# Patient Record
Sex: MALE | Race: BLACK OR AFRICAN AMERICAN | URBAN - METROPOLITAN AREA
[De-identification: names, ages, dates, MRNs, and addresses within clinical notes are randomized per-mention and may not be internally consistent; named-entity substitution may affect disease eponyms.]

---

## 2020-11-06 ENCOUNTER — OFFICE VISIT (OUTPATIENT)
Dept: URBAN - METROPOLITAN AREA CLINIC 98 | Facility: CLINIC | Age: 50
End: 2020-11-06

## 2021-01-19 ENCOUNTER — OFFICE VISIT (OUTPATIENT)
Dept: URBAN - METROPOLITAN AREA CLINIC 98 | Facility: CLINIC | Age: 51
End: 2021-01-19
Payer: COMMERCIAL

## 2021-01-19 VITALS
TEMPERATURE: 97.1 F | HEIGHT: 68 IN | WEIGHT: 183.8 LBS | SYSTOLIC BLOOD PRESSURE: 162 MMHG | BODY MASS INDEX: 27.86 KG/M2 | HEART RATE: 71 BPM | DIASTOLIC BLOOD PRESSURE: 97 MMHG

## 2021-01-19 DIAGNOSIS — R74.8 ELEVATED LIVER ENZYMES: ICD-10-CM

## 2021-01-19 DIAGNOSIS — K76.0 FATTY LIVER: ICD-10-CM

## 2021-01-19 DIAGNOSIS — Z12.11 COLON CANCER SCREENING: ICD-10-CM

## 2021-01-19 PROCEDURE — G8482 FLU IMMUNIZE ORDER/ADMIN: HCPCS | Performed by: INTERNAL MEDICINE

## 2021-01-19 PROCEDURE — 99244 OFF/OP CNSLTJ NEW/EST MOD 40: CPT | Performed by: INTERNAL MEDICINE

## 2021-01-19 PROCEDURE — 99204 OFFICE O/P NEW MOD 45 MIN: CPT | Performed by: INTERNAL MEDICINE

## 2021-01-19 RX ORDER — SODIUM, POTASSIUM,MAG SULFATES 17.5-3.13G
354 ML SOLUTION, RECONSTITUTED, ORAL ORAL
Qty: 354 ML | Refills: 0 | OUTPATIENT
Start: 2021-01-19 | End: 2021-01-20

## 2021-01-19 NOTE — HPI-TODAY'S VISIT:
HEre on referral from Dr Piper Martinez for elevated liver tests and fatty liver.  A copy of this note and findings will be sent to him as well as ID DR Twin Joshi.  Pt w HIV dx 2001 Dr Joshi - Kaletra + combivir --> Truvada. also other combinations as well now on Biktarvy, started abt 1 yr ago med changes bc pt wanted new meds, no bc he failed / got resistance. . middle of last year- started to see liver test elevated.   also found to have Type 2 DM : middle of last yr started on Metformin  BP also started to rise at the same time. has not had cholesterol med but levels have  been a little high in past. He admits freely: " I don't exercise, I eat out a lot" no tob or alcohol however

## 2021-01-20 ENCOUNTER — TELEPHONE ENCOUNTER (OUTPATIENT)
Dept: URBAN - METROPOLITAN AREA SURGERY CENTER 30 | Facility: SURGERY CENTER | Age: 51
End: 2021-01-20

## 2021-01-21 LAB
A/G RATIO: 1.2
ACTIN (SMOOTH MUSCLE) ANTIBODY: 12
ALBUMIN: 4.2
ALKALINE PHOSPHATASE: 80
ALT (SGPT): 74
ANTI-CENTROMERE B ANTIBODIES: <0.2
ANTI-DNA (DS) AB QN: <1
ANTI-JO-1: <0.2
ANTICHROMATIN ANTIBODIES: <0.2
ANTISCLERODERMA-70 ANTIBODIES: <0.2
AST (SGOT): 52
BASO (ABSOLUTE): 0.1
BASOS: 1
BILIRUBIN, TOTAL: 0.4
BUN/CREATININE RATIO: 12
BUN: 13
CALCIUM: 9.3
CARBON DIOXIDE, TOTAL: 24
CHLORIDE: 104
COMMENT:: (no result)
CREATININE: 1.08
EGFR IF AFRICN AM: 93
EGFR IF NONAFRICN AM: 81
EOS (ABSOLUTE): 0.1
EOS: 1
FERRITIN, SERUM: 557
GGT: 69
GLOBULIN, TOTAL: 3.6
GLUCOSE: 94
HBSAG SCREEN: NEGATIVE
HCV AB: <0.1
HEMATOCRIT: 45.8
HEMATOLOGY COMMENTS:: (no result)
HEMOGLOBIN: 16.2
HEP A AB, TOTAL: NEGATIVE
HEP B CORE AB, TOT: POSITIVE
HEP B SURFACE AB, QUAL: REACTIVE
IMMATURE CELLS: (no result)
IMMATURE GRANS (ABS): 0.1
IMMATURE GRANULOCYTES: 1
IMMUNOGLOBULIN A, QN, SERUM: 562
IMMUNOGLOBULIN G, QN, SERUM: 1617
IMMUNOGLOBULIN M, QN, SERUM: 50
IRON BIND.CAP.(TIBC): 306
IRON SATURATION: 35
IRON: 107
LYMPHS (ABSOLUTE): 5.6
LYMPHS: 51
Lab: (no result)
MCH: 33.6
MCHC: 35.4
MCV: 95
MITOCHONDRIAL (M2) ANTIBODY: 24.8
MONOCYTES(ABSOLUTE): 0.7
MONOCYTES: 6
NEUTROPHILS (ABSOLUTE): 4.4
NEUTROPHILS: 40
NRBC: (no result)
PLATELETS: 142
POTASSIUM: 4.1
PROTEIN, TOTAL: 7.8
RBC: 4.82
RDW: 12.2
RNP ANTIBODIES: 0.2
SJOGREN'S ANTI-SS-A: <0.2
SJOGREN'S ANTI-SS-B: <0.2
SMITH ANTIBODIES: <0.2
SODIUM: 143
UIBC: 199
WBC: 10.9

## 2021-01-26 ENCOUNTER — TELEPHONE ENCOUNTER (OUTPATIENT)
Dept: URBAN - METROPOLITAN AREA CLINIC 92 | Facility: CLINIC | Age: 51
End: 2021-01-26

## 2021-01-27 ENCOUNTER — CLAIMS CREATED FROM THE CLAIM WINDOW (OUTPATIENT)
Dept: URBAN - METROPOLITAN AREA CLINIC 4 | Facility: CLINIC | Age: 51
End: 2021-01-27
Payer: COMMERCIAL

## 2021-01-27 ENCOUNTER — OFFICE VISIT (OUTPATIENT)
Dept: URBAN - METROPOLITAN AREA SURGERY CENTER 18 | Facility: SURGERY CENTER | Age: 51
End: 2021-01-27
Payer: COMMERCIAL

## 2021-01-27 DIAGNOSIS — D12.5 ADENOMA OF SIGMOID COLON: ICD-10-CM

## 2021-01-27 DIAGNOSIS — D12.5 BENIGN NEOPLASM OF SIGMOID COLON: ICD-10-CM

## 2021-01-27 DIAGNOSIS — Z12.11 COLON CANCER SCREENING: ICD-10-CM

## 2021-01-27 PROCEDURE — 88305 TISSUE EXAM BY PATHOLOGIST: CPT | Performed by: PATHOLOGY

## 2021-01-27 PROCEDURE — 45380 COLONOSCOPY AND BIOPSY: CPT | Performed by: INTERNAL MEDICINE

## 2021-01-27 PROCEDURE — G8907 PT DOC NO EVENTS ON DISCHARG: HCPCS | Performed by: INTERNAL MEDICINE

## 2021-03-03 PROBLEM — 19943007: Status: ACTIVE | Noted: 2021-03-03

## 2021-03-05 ENCOUNTER — OFFICE VISIT (OUTPATIENT)
Dept: URBAN - METROPOLITAN AREA CLINIC 98 | Facility: CLINIC | Age: 51
End: 2021-03-05
Payer: COMMERCIAL

## 2021-03-05 VITALS
SYSTOLIC BLOOD PRESSURE: 143 MMHG | HEIGHT: 68 IN | TEMPERATURE: 98.2 F | WEIGHT: 182.4 LBS | BODY MASS INDEX: 27.65 KG/M2 | DIASTOLIC BLOOD PRESSURE: 97 MMHG | HEART RATE: 80 BPM

## 2021-03-05 DIAGNOSIS — K74.02 HEPATIC FIBROSIS, ADVANCED FIBROSIS: ICD-10-CM

## 2021-03-05 DIAGNOSIS — K75.81 NONALCOHOLIC STEATOHEPATITIS (NASH): ICD-10-CM

## 2021-03-05 PROBLEM — 197321007 FATTY LIVER: Status: ACTIVE | Noted: 2021-01-19

## 2021-03-05 PROCEDURE — G8482 FLU IMMUNIZE ORDER/ADMIN: HCPCS | Performed by: INTERNAL MEDICINE

## 2021-03-05 PROCEDURE — 99214 OFFICE O/P EST MOD 30 MIN: CPT | Performed by: INTERNAL MEDICINE

## 2021-03-05 PROCEDURE — G8420 CALC BMI NORM PARAMETERS: HCPCS | Performed by: INTERNAL MEDICINE

## 2021-03-05 PROCEDURE — 3017F COLORECTAL CA SCREEN DOC REV: CPT | Performed by: INTERNAL MEDICINE

## 2021-03-05 PROCEDURE — G9459 TOB NON-USER: HCPCS | Performed by: INTERNAL MEDICINE

## 2021-03-05 PROCEDURE — G8427 DOCREV CUR MEDS BY ELIG CLIN: HCPCS | Performed by: INTERNAL MEDICINE

## 2021-03-05 NOTE — HPI-TODAY'S VISIT:
bought an air fryer - planning to use now  still has not used gym which is right downstairs from his apt  wt remains stable.

## 2021-09-05 ENCOUNTER — LAB OUTSIDE AN ENCOUNTER (OUTPATIENT)
Dept: URBAN - METROPOLITAN AREA CLINIC 98 | Facility: CLINIC | Age: 51
End: 2021-09-05

## 2021-09-10 ENCOUNTER — OFFICE VISIT (OUTPATIENT)
Dept: URBAN - METROPOLITAN AREA CLINIC 98 | Facility: CLINIC | Age: 51
End: 2021-09-10

## 2021-10-08 ENCOUNTER — OFFICE VISIT (OUTPATIENT)
Dept: URBAN - METROPOLITAN AREA CLINIC 98 | Facility: CLINIC | Age: 51
End: 2021-10-08

## 2021-10-18 ENCOUNTER — LAB OUTSIDE AN ENCOUNTER (OUTPATIENT)
Dept: URBAN - METROPOLITAN AREA CLINIC 98 | Facility: CLINIC | Age: 51
End: 2021-10-18

## 2021-10-18 ENCOUNTER — OFFICE VISIT (OUTPATIENT)
Dept: URBAN - METROPOLITAN AREA CLINIC 98 | Facility: CLINIC | Age: 51
End: 2021-10-18
Payer: COMMERCIAL

## 2021-10-18 VITALS
SYSTOLIC BLOOD PRESSURE: 158 MMHG | HEART RATE: 75 BPM | BODY MASS INDEX: 27.01 KG/M2 | HEIGHT: 68 IN | TEMPERATURE: 99.1 F | DIASTOLIC BLOOD PRESSURE: 90 MMHG | WEIGHT: 178.2 LBS

## 2021-10-18 DIAGNOSIS — K74.02 HEPATIC FIBROSIS, ADVANCED FIBROSIS: ICD-10-CM

## 2021-10-18 DIAGNOSIS — K75.81 NONALCOHOLIC STEATOHEPATITIS (NASH): ICD-10-CM

## 2021-10-18 PROCEDURE — 99213 OFFICE O/P EST LOW 20 MIN: CPT | Performed by: INTERNAL MEDICINE

## 2021-10-18 NOTE — HPI-OTHER HISTORIES
Still has not made much change in diet  work pressure @ [place] is keeping him from following diet and exercise he says  he knows he should but has not yet  moving to new place in [place] - hopes to start exercising more outdoors

## 2021-10-27 ENCOUNTER — TELEPHONE ENCOUNTER (OUTPATIENT)
Dept: URBAN - METROPOLITAN AREA CLINIC 98 | Facility: CLINIC | Age: 51
End: 2021-10-27

## 2021-10-27 LAB
CHOL/HDLC RATIO: 5.6
CHOLESTEROL, TOTAL: 184
HDL CHOLESTEROL: 33
LDL-CHOLESTEROL: 132
NON HDL CHOLESTEROL: 151
TRIGLYCERIDES: 93

## 2021-10-28 ENCOUNTER — TELEPHONE ENCOUNTER (OUTPATIENT)
Dept: URBAN - METROPOLITAN AREA CLINIC 98 | Facility: CLINIC | Age: 51
End: 2021-10-28

## 2022-05-31 ENCOUNTER — OFFICE VISIT (OUTPATIENT)
Dept: URBAN - METROPOLITAN AREA CLINIC 98 | Facility: CLINIC | Age: 52
End: 2022-05-31

## 2022-08-02 ENCOUNTER — OFFICE VISIT (OUTPATIENT)
Dept: URBAN - METROPOLITAN AREA CLINIC 98 | Facility: CLINIC | Age: 52
End: 2022-08-02

## 2023-02-02 ENCOUNTER — LAB OUTSIDE AN ENCOUNTER (OUTPATIENT)
Dept: URBAN - METROPOLITAN AREA CLINIC 98 | Facility: CLINIC | Age: 53
End: 2023-02-02

## 2023-02-02 ENCOUNTER — DASHBOARD ENCOUNTERS (OUTPATIENT)
Age: 53
End: 2023-02-02

## 2023-02-02 ENCOUNTER — WEB ENCOUNTER (OUTPATIENT)
Dept: URBAN - METROPOLITAN AREA CLINIC 98 | Facility: CLINIC | Age: 53
End: 2023-02-02

## 2023-02-02 ENCOUNTER — OFFICE VISIT (OUTPATIENT)
Dept: URBAN - METROPOLITAN AREA CLINIC 98 | Facility: CLINIC | Age: 53
End: 2023-02-02
Payer: COMMERCIAL

## 2023-02-02 VITALS
HEART RATE: 78 BPM | DIASTOLIC BLOOD PRESSURE: 97 MMHG | TEMPERATURE: 97.2 F | WEIGHT: 189 LBS | HEIGHT: 68 IN | BODY MASS INDEX: 28.64 KG/M2 | SYSTOLIC BLOOD PRESSURE: 165 MMHG

## 2023-02-02 DIAGNOSIS — K75.81 NONALCOHOLIC STEATOHEPATITIS (NASH): ICD-10-CM

## 2023-02-02 DIAGNOSIS — K74.02 HEPATIC FIBROSIS, ADVANCED FIBROSIS: ICD-10-CM

## 2023-02-02 DIAGNOSIS — R74.8 ELEVATED LIVER ENZYMES: ICD-10-CM

## 2023-02-02 DIAGNOSIS — R10.9 LEFT FLANK PAIN: ICD-10-CM

## 2023-02-02 PROBLEM — 266468003: Status: ACTIVE | Noted: 2021-03-03

## 2023-02-02 PROCEDURE — 99213 OFFICE O/P EST LOW 20 MIN: CPT

## 2023-02-02 RX ORDER — ERGOCALCIFEROL 1.25 MG/1
CAPSULE, LIQUID FILLED ORAL
Qty: 12 CAPSULE | Status: ACTIVE | COMMUNITY

## 2023-02-02 RX ORDER — NIFEDIPINE 30 MG/1
TABLET, EXTENDED RELEASE ORAL
Qty: 90 TABLET | Status: ACTIVE | COMMUNITY

## 2023-02-02 RX ORDER — LISINOPRIL 40 MG/1
TABLET ORAL
Qty: 90 TABLET | Status: ACTIVE | COMMUNITY

## 2023-02-02 NOTE — HPI-TODAY'S VISIT:
Patient is a 52-year-old male with a past medical history significant for HIV which was diagnosed in 2001.   Started on Biktarvy in 2020.   He initially consulted with Dr. Fleming in 2021 for elevated liver enzymes.  Found to have type 2 diabetes and was started on metformin.  FibroScan completed 2/4/2021 which revealed a CAP of 305 suggestive of F2 F3 fibrosis with severe steatosis. He endorses the reason he has come back to the office is because of left sided abdominal pain Reports he has gained weight and now with abdominal bloating Has a "burning pain" in the left side especially when he bends over. This has occurred for years intermittently  Is wondering if it kidney - consulted with PCP who suggested it could be.  Labs done a week ago- he believes everything is ok BMs are described as daily- can have straining with BMs but reports bowel movements are regular  Denies BRBPR or melena Used Miralax for 7 days at recommendation of PCP- which improved "softness of stools"  Not currently taking anything at this time Denies unintentional weight loss  Denies vomiting or GERD symptoms Colonoscopy completed in 2021 with Dr. Fleming which revealed diverticulosos in the recto-sigmoid colon and 1, 2 mm polyp in the sigmoid colon. Pathology reuslts revealed tubular adeoma. Recommended repeat in 5 years

## 2023-03-27 ENCOUNTER — TELEPHONE ENCOUNTER (OUTPATIENT)
Dept: URBAN - METROPOLITAN AREA CLINIC 63 | Facility: CLINIC | Age: 53
End: 2023-03-27

## 2023-03-30 ENCOUNTER — TELEPHONE ENCOUNTER (OUTPATIENT)
Dept: URBAN - METROPOLITAN AREA CLINIC 95 | Facility: CLINIC | Age: 53
End: 2023-03-30

## 2023-04-11 ENCOUNTER — TELEPHONE ENCOUNTER (OUTPATIENT)
Dept: URBAN - METROPOLITAN AREA CLINIC 6 | Facility: CLINIC | Age: 53
End: 2023-04-11

## 2023-04-13 ENCOUNTER — TELEPHONE ENCOUNTER (OUTPATIENT)
Dept: URBAN - METROPOLITAN AREA CLINIC 6 | Facility: CLINIC | Age: 53
End: 2023-04-13